# Patient Record
Sex: MALE | Race: WHITE | NOT HISPANIC OR LATINO
[De-identification: names, ages, dates, MRNs, and addresses within clinical notes are randomized per-mention and may not be internally consistent; named-entity substitution may affect disease eponyms.]

---

## 2020-11-25 ENCOUNTER — TRANSCRIPTION ENCOUNTER (OUTPATIENT)
Age: 40
End: 2020-11-25

## 2022-03-28 ENCOUNTER — TRANSCRIPTION ENCOUNTER (OUTPATIENT)
Age: 42
End: 2022-03-28

## 2022-05-11 ENCOUNTER — NON-APPOINTMENT (OUTPATIENT)
Age: 42
End: 2022-05-11

## 2023-05-05 ENCOUNTER — APPOINTMENT (OUTPATIENT)
Dept: ORTHOPEDIC SURGERY | Facility: CLINIC | Age: 43
End: 2023-05-05
Payer: COMMERCIAL

## 2023-05-05 VITALS — WEIGHT: 220 LBS | HEIGHT: 70 IN | BODY MASS INDEX: 31.5 KG/M2

## 2023-05-05 DIAGNOSIS — S62.646A NONDISPLACED FRACTURE OF PROXIMAL PHALANX OF RIGHT LITTLE FINGER, INITIAL ENCOUNTER FOR CLOSED FRACTURE: ICD-10-CM

## 2023-05-05 DIAGNOSIS — Z86.79 PERSONAL HISTORY OF OTHER DISEASES OF THE CIRCULATORY SYSTEM: ICD-10-CM

## 2023-05-05 PROCEDURE — 73140 X-RAY EXAM OF FINGER(S): CPT | Mod: LT

## 2023-05-05 PROCEDURE — 99203 OFFICE O/P NEW LOW 30 MIN: CPT

## 2023-05-05 PROCEDURE — 29280 STRAPPING OF HAND OR FINGER: CPT
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
## 2023-05-05 NOTE — PHYSICAL EXAM
[Distal Phalanx] : distal phalanx [DIP Joint] : DIP joint [Middle Phalanx] : middle phalanx [5th Finger] : 5th finger [5th] : 5th [Proximal Phalanx] : proximal phalanx [PIP Joint] : PIP joint [NL (75)] : Dorsiflexion 75 degrees [NL (85)] : volarflexion 85 degrees [] : light touch intact throughout [5___] : volarflexion 5[unfilled]/5 [Left] : left fingers [Fracture] : Fracture

## 2023-05-05 NOTE — ASSESSMENT
[FreeTextEntry1] : Xrays reviewed with patient\par Treatment options discussed \par Buddly loops applied today - medically necessary for stability\par otc anti-inflammatories / ice as needed\par Activity modification discussed\par Follow up with Dr. Clay next week

## 2023-05-11 ENCOUNTER — APPOINTMENT (OUTPATIENT)
Dept: ORTHOPEDIC SURGERY | Facility: CLINIC | Age: 43
End: 2023-05-11
Payer: COMMERCIAL

## 2023-05-11 VITALS — HEIGHT: 70 IN | BODY MASS INDEX: 31.5 KG/M2 | WEIGHT: 220 LBS

## 2023-05-11 DIAGNOSIS — S63.637A SPRAIN OF INTERPHALANGEAL JOINT OF LEFT LITTLE FINGER, INITIAL ENCOUNTER: ICD-10-CM

## 2023-05-11 PROCEDURE — 99213 OFFICE O/P EST LOW 20 MIN: CPT | Mod: 25

## 2023-05-11 PROCEDURE — 29280 STRAPPING OF HAND OR FINGER: CPT
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
## 2023-05-11 NOTE — IMAGING
[de-identified] : left small finger:\par mild swelling/ecchymosis\par ttp over PIP\par farom\par normal cascade\par nvid

## 2023-05-11 NOTE — DATA REVIEWED
[Outside X-rays] : outside x-rays [Left] : left [Hand] : hand [I independently reviewed and interpreted images and report] : I independently reviewed and interpreted images and report [FreeTextEntry1] : avulsion at base of middle phalanx
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
## 2023-05-11 NOTE — ASSESSMENT
[FreeTextEntry1] : The patient was advised of the diagnosis. The natural history of the pathology was explained in full to the patient in layman's terms. We discussed that the patient would be better off with early protected range of motion.  Splinting leads to a high incidence of stiffness.  I recommend freida strapping(strap placed) and early range of motion exercises x 3weeks.  If motion is near normal the patient can continue with a home exercise program.  If the patient is having a lot of difficulty with self directed exercises, therapy may be recommended.  All questions were answered. The risks and benefits of surgical and non-surgical treatment alternatives were explained in full to the patient.\par \par The affected finger is noted to be clean and dry.  A freida loop was applied to the affected finger over the middle phalanx. It was then strapped to the adjacent finger in overlapping fashion. Fit and pressure were assessed as the strapping was applied and stopped when the desired amount of support was achieved\par \par Pt will wear freida loops\par F/u in 3 wks

## 2023-05-11 NOTE — PHYSICAL EXAM
[Distal Phalanx] : distal phalanx [DIP Joint] : DIP joint [Middle Phalanx] : middle phalanx [5th Finger] : 5th finger [5th] : 5th [Proximal Phalanx] : proximal phalanx [PIP Joint] : PIP joint [NL (75)] : Dorsiflexion 75 degrees [NL (85)] : volarflexion 85 degrees [5___] : volarflexion 5[unfilled]/5 [] : light touch intact throughout [Left] : left fingers [Fracture] : Fracture

## 2024-09-15 ENCOUNTER — NON-APPOINTMENT (OUTPATIENT)
Age: 44
End: 2024-09-15

## 2025-03-24 ENCOUNTER — APPOINTMENT (OUTPATIENT)
Dept: GASTROENTEROLOGY | Facility: CLINIC | Age: 45
End: 2025-03-24
Payer: COMMERCIAL

## 2025-03-24 VITALS
WEIGHT: 220 LBS | TEMPERATURE: 97.7 F | HEART RATE: 84 BPM | SYSTOLIC BLOOD PRESSURE: 118 MMHG | HEIGHT: 70 IN | OXYGEN SATURATION: 98 % | DIASTOLIC BLOOD PRESSURE: 70 MMHG | BODY MASS INDEX: 31.5 KG/M2

## 2025-03-24 DIAGNOSIS — Z12.10 ENCOUNTER FOR SCREENING FOR MALIGNANT NEOPLASM OF INTESTINAL TRACT, UNSPECIFIED: ICD-10-CM

## 2025-03-24 DIAGNOSIS — I25.10 ATHEROSCLEROTIC HEART DISEASE OF NATIVE CORONARY ARTERY W/OUT ANGINA PECTORIS: ICD-10-CM

## 2025-03-24 DIAGNOSIS — Z82.49 FAMILY HISTORY OF ISCHEMIC HEART DISEASE AND OTHER DISEASES OF THE CIRCULATORY SYSTEM: ICD-10-CM

## 2025-03-24 DIAGNOSIS — Z78.9 OTHER SPECIFIED HEALTH STATUS: ICD-10-CM

## 2025-03-24 DIAGNOSIS — E78.5 HYPERLIPIDEMIA, UNSPECIFIED: ICD-10-CM

## 2025-03-24 PROCEDURE — 99203 OFFICE O/P NEW LOW 30 MIN: CPT

## 2025-03-24 RX ORDER — LISINOPRIL 5 MG/1
5 TABLET ORAL
Refills: 0 | Status: ACTIVE | COMMUNITY

## 2025-03-24 RX ORDER — ATORVASTATIN CALCIUM 80 MG/1
80 TABLET, FILM COATED ORAL
Refills: 0 | Status: ACTIVE | COMMUNITY

## 2025-03-24 RX ORDER — ASPIRIN 81 MG
81 TABLET,CHEWABLE ORAL
Refills: 0 | Status: ACTIVE | COMMUNITY

## 2025-05-20 ENCOUNTER — APPOINTMENT (OUTPATIENT)
Dept: GASTROENTEROLOGY | Facility: AMBULATORY MEDICAL SERVICES | Age: 45
End: 2025-05-20
Payer: COMMERCIAL

## 2025-05-20 PROCEDURE — 45385 COLONOSCOPY W/LESION REMOVAL: CPT | Mod: 33
